# Patient Record
Sex: FEMALE | Race: ASIAN | URBAN - METROPOLITAN AREA
[De-identification: names, ages, dates, MRNs, and addresses within clinical notes are randomized per-mention and may not be internally consistent; named-entity substitution may affect disease eponyms.]

---

## 2017-06-06 ENCOUNTER — APPOINTMENT (OUTPATIENT)
Dept: URBAN - METROPOLITAN AREA SURGERY 9 | Age: 27
Setting detail: DERMATOLOGY
End: 2017-06-06

## 2017-06-06 DIAGNOSIS — L70.0 ACNE VULGARIS: ICD-10-CM

## 2017-06-06 DIAGNOSIS — L24 IRRITANT CONTACT DERMATITIS: ICD-10-CM

## 2017-06-06 PROBLEM — L24.9 IRRITANT CONTACT DERMATITIS, UNSPECIFIED CAUSE: Status: ACTIVE | Noted: 2017-06-06

## 2017-06-06 PROCEDURE — OTHER COUNSELING: OTHER

## 2017-06-06 PROCEDURE — OTHER TREATMENT REGIMEN: OTHER

## 2017-06-06 PROCEDURE — OTHER PRESCRIPTION: OTHER

## 2017-06-06 PROCEDURE — 99202 OFFICE O/P NEW SF 15 MIN: CPT

## 2017-06-06 RX ORDER — DAPSONE 75 MG/G
GEL TOPICAL
Qty: 1 | Refills: 2 | Status: ERX | COMMUNITY
Start: 2017-06-06

## 2017-06-06 RX ORDER — DOXYCYCLINE HYCLATE 100 MG/1
CAPSULE, GELATIN COATED ORAL
Qty: 60 | Refills: 2 | Status: ERX | COMMUNITY
Start: 2017-06-06

## 2017-06-06 RX ORDER — CLINDAMYCIN PHOSPHATE 1 %
GEL (GRAM) TOPICAL
Qty: 1 | Refills: 2 | Status: ERX | COMMUNITY
Start: 2017-06-06

## 2017-06-06 RX ORDER — HYDROCORTISONE 25 MG/G
CREAM TOPICAL
Qty: 1 | Refills: 0 | Status: ERX | COMMUNITY
Start: 2017-06-06

## 2017-06-06 ASSESSMENT — LOCATION SIMPLE DESCRIPTION DERM
LOCATION SIMPLE: LEFT CHEEK
LOCATION SIMPLE: CHIN

## 2017-06-06 ASSESSMENT — LOCATION DETAILED DESCRIPTION DERM
LOCATION DETAILED: LEFT CENTRAL MALAR CHEEK
LOCATION DETAILED: RIGHT CHIN

## 2017-06-06 ASSESSMENT — LOCATION ZONE DERM: LOCATION ZONE: FACE

## 2017-06-06 NOTE — PROCEDURE: TREATMENT REGIMEN
Detail Level: Zone
Plan: Advised patient to avoid acne topical medication on the chin when the rash is present
Initiate Treatment: HC 2.5% twice daily for 10 days
Initiate Treatment: Doxycycline 100mg twice daily. Aczone 7.5% gel every morning and clindamycin gel every night

## 2023-01-12 NOTE — HPI: PIMPLES (ACNE)
Is This A New Presentation, Or A Follow-Up?: Acne
Plan: Continue Betamethasone as needed up to 14 days, avoiding the face.
Detail Level: Simple
Render In Strict Bullet Format?: No